# Patient Record
Sex: MALE | Race: WHITE | NOT HISPANIC OR LATINO | Employment: OTHER | URBAN - METROPOLITAN AREA
[De-identification: names, ages, dates, MRNs, and addresses within clinical notes are randomized per-mention and may not be internally consistent; named-entity substitution may affect disease eponyms.]

---

## 2017-03-07 ENCOUNTER — HOSPITAL ENCOUNTER (OUTPATIENT)
Facility: CLINIC | Age: 67
End: 2017-03-07
Attending: INTERNAL MEDICINE | Admitting: INTERNAL MEDICINE
Payer: MEDICARE

## 2017-03-31 ENCOUNTER — ANESTHESIA (OUTPATIENT)
Dept: GASTROENTEROLOGY | Facility: CLINIC | Age: 67
End: 2017-03-31

## 2017-03-31 ENCOUNTER — ANESTHESIA EVENT (OUTPATIENT)
Dept: GASTROENTEROLOGY | Facility: CLINIC | Age: 67
End: 2017-03-31

## 2017-05-23 ENCOUNTER — HOSPITAL ENCOUNTER (OUTPATIENT)
Facility: CLINIC | Age: 67
Discharge: HOME | End: 2017-05-23
Attending: INTERNAL MEDICINE | Admitting: INTERNAL MEDICINE
Payer: MEDICARE

## 2017-05-23 ENCOUNTER — ANESTHESIA (OUTPATIENT)
Dept: GASTROENTEROLOGY | Facility: CLINIC | Age: 67
End: 2017-05-23
Payer: MEDICARE

## 2017-05-23 ENCOUNTER — SURGERY (OUTPATIENT)
Age: 67
End: 2017-05-23

## 2017-05-23 ENCOUNTER — ANESTHESIA EVENT (OUTPATIENT)
Dept: GASTROENTEROLOGY | Facility: CLINIC | Age: 67
End: 2017-05-23
Payer: MEDICARE

## 2017-05-23 VITALS
HEIGHT: 73 IN | DIASTOLIC BLOOD PRESSURE: 83 MMHG | BODY MASS INDEX: 41.75 KG/M2 | RESPIRATION RATE: 19 BRPM | SYSTOLIC BLOOD PRESSURE: 130 MMHG | WEIGHT: 315 LBS | OXYGEN SATURATION: 94 %

## 2017-05-23 LAB — COLONOSCOPY: NORMAL

## 2017-05-23 PROCEDURE — 25000125 ZZHC RX 250: Performed by: NURSE ANESTHETIST, CERTIFIED REGISTERED

## 2017-05-23 PROCEDURE — 45378 DIAGNOSTIC COLONOSCOPY: CPT | Performed by: INTERNAL MEDICINE

## 2017-05-23 PROCEDURE — 25000128 H RX IP 250 OP 636: Performed by: NURSE ANESTHETIST, CERTIFIED REGISTERED

## 2017-05-23 PROCEDURE — G0105 COLORECTAL SCRN; HI RISK IND: HCPCS | Performed by: INTERNAL MEDICINE

## 2017-05-23 PROCEDURE — 37000008 ZZH ANESTHESIA TECHNICAL FEE, 1ST 30 MIN: Performed by: INTERNAL MEDICINE

## 2017-05-23 PROCEDURE — 40000010 ZZH STATISTIC ANES STAT CODE-CRNA PER MINUTE: Performed by: INTERNAL MEDICINE

## 2017-05-23 PROCEDURE — 37000009 ZZH ANESTHESIA TECHNICAL FEE, EACH ADDTL 15 MIN: Performed by: INTERNAL MEDICINE

## 2017-05-23 RX ORDER — LIDOCAINE HYDROCHLORIDE 20 MG/ML
INJECTION, SOLUTION INFILTRATION; PERINEURAL PRN
Status: DISCONTINUED | OUTPATIENT
Start: 2017-05-23 | End: 2017-05-23

## 2017-05-23 RX ORDER — PROPOFOL 10 MG/ML
INJECTION, EMULSION INTRAVENOUS CONTINUOUS PRN
Status: DISCONTINUED | OUTPATIENT
Start: 2017-05-23 | End: 2017-05-23

## 2017-05-23 RX ORDER — LIRAGLUTIDE 6 MG/ML
INJECTION SUBCUTANEOUS DAILY
COMMUNITY

## 2017-05-23 RX ORDER — ONDANSETRON 2 MG/ML
4 INJECTION INTRAMUSCULAR; INTRAVENOUS
Status: DISCONTINUED | OUTPATIENT
Start: 2017-05-23 | End: 2017-05-23 | Stop reason: HOSPADM

## 2017-05-23 RX ORDER — ONDANSETRON 2 MG/ML
INJECTION INTRAMUSCULAR; INTRAVENOUS PRN
Status: DISCONTINUED | OUTPATIENT
Start: 2017-05-23 | End: 2017-05-23

## 2017-05-23 RX ORDER — FENTANYL CITRATE 50 UG/ML
INJECTION, SOLUTION INTRAMUSCULAR; INTRAVENOUS PRN
Status: DISCONTINUED | OUTPATIENT
Start: 2017-05-23 | End: 2017-05-23

## 2017-05-23 RX ORDER — LIDOCAINE 40 MG/G
CREAM TOPICAL
Status: DISCONTINUED | OUTPATIENT
Start: 2017-05-23 | End: 2017-05-23 | Stop reason: HOSPADM

## 2017-05-23 RX ORDER — SODIUM CHLORIDE, SODIUM LACTATE, POTASSIUM CHLORIDE, CALCIUM CHLORIDE 600; 310; 30; 20 MG/100ML; MG/100ML; MG/100ML; MG/100ML
INJECTION, SOLUTION INTRAVENOUS CONTINUOUS PRN
Status: DISCONTINUED | OUTPATIENT
Start: 2017-05-23 | End: 2017-05-23

## 2017-05-23 RX ADMIN — LIDOCAINE HYDROCHLORIDE 60 MG: 20 INJECTION, SOLUTION INFILTRATION; PERINEURAL at 14:16

## 2017-05-23 RX ADMIN — FENTANYL CITRATE 50 MCG: 50 INJECTION, SOLUTION INTRAMUSCULAR; INTRAVENOUS at 14:16

## 2017-05-23 RX ADMIN — DEXMEDETOMIDINE HYDROCHLORIDE 12 MCG: 100 INJECTION, SOLUTION INTRAVENOUS at 14:15

## 2017-05-23 RX ADMIN — ONDANSETRON 4 MG: 2 INJECTION INTRAMUSCULAR; INTRAVENOUS at 14:51

## 2017-05-23 RX ADMIN — MIDAZOLAM HYDROCHLORIDE 2 MG: 1 INJECTION, SOLUTION INTRAMUSCULAR; INTRAVENOUS at 14:16

## 2017-05-23 RX ADMIN — DEXMEDETOMIDINE HYDROCHLORIDE 8 MCG: 100 INJECTION, SOLUTION INTRAVENOUS at 14:27

## 2017-05-23 RX ADMIN — PROPOFOL 100 MCG/KG/MIN: 10 INJECTION, EMULSION INTRAVENOUS at 14:17

## 2017-05-23 RX ADMIN — SODIUM CHLORIDE, POTASSIUM CHLORIDE, SODIUM LACTATE AND CALCIUM CHLORIDE: 600; 310; 30; 20 INJECTION, SOLUTION INTRAVENOUS at 14:16

## 2017-05-23 ASSESSMENT — LIFESTYLE VARIABLES: TOBACCO_USE: 0

## 2017-05-23 ASSESSMENT — ENCOUNTER SYMPTOMS: SEIZURES: 0

## 2017-05-23 NOTE — ANESTHESIA CARE TRANSFER NOTE
Patient: Vikash Darby    Procedure(s):  COLONSCOPY - Wound Class: II-Clean Contaminated    Diagnosis: HISTORY OF COLON POLYPS, FIVE YEAR RECALL  Diagnosis Additional Information: No value filed.    Anesthesia Type:   MAC     Note:  Airway :Oral Airway and Nasal Cannula  Patient transferred to:Phase II  Comments: Transferred to PACU, spontaneous respirations. Oxygen via nasal cannual at 2 LPM to PACU, connected to wall O2 in PACU. All monitors and alarms on and functioning, clinically stable vital signs. Report given to PACU RN and questions answered.       Vitals: (Last set prior to Anesthesia Care Transfer)    CRNA VITALS  5/23/2017 1427 - 5/23/2017 1511      5/23/2017             Resp Rate (set): 10                Electronically Signed By: JADE Beebe CRNA  May 23, 2017  3:11 PM

## 2017-05-23 NOTE — ANESTHESIA PREPROCEDURE EVALUATION
"Procedure: Procedure(s):  COLONOSCOPY  Preop diagnosis: HISTORY OF COLON POLYPS, FIVE YEAR RECALL  Allergies not on file  There is no problem list on file for this patient.    History reviewed. No pertinent past medical history.  History reviewed. No pertinent surgical history.    No current facility-administered medications on file prior to encounter.   No current outpatient prescriptions on file prior to encounter.  BP (!) 141/96  Resp 16  Ht 1.854 m (6' 1\")  Wt (!) 158.8 kg (350 lb)  SpO2 92%  BMI 46.18 kg/m2    EKG- ST, LAD,RBBB with LAFB    Anesthesia Evaluation     . Pt has not had prior anesthetic            ROS/MED HX    ENT/Pulmonary: Comment: No formal sleep study, but probably has it given history    (+)SANDHYA risk factors snores loudly, obese, observed stopped breathing , . .   (-) tobacco use and recent URI   Neurologic:      (-) seizures, CVA and migraines   Cardiovascular: Comment: Poor images with stress echo, scheduled to have nuclear stress 5/25/2017    (+) hypertension----. : . . . :. .       METS/Exercise Tolerance:     Hematologic:  - neg hematologic  ROS       Musculoskeletal:  - neg musculoskeletal ROS       GI/Hepatic:     (+) Other GI/Hepatic hx polyps     (-) GERD   Renal/Genitourinary:     (+) BPH,       Endo:     (+) type II DM Obesity, .   (-) thyroid disease   Psychiatric:         Infectious Disease:         Malignancy:         Other:                     Physical Exam  Normal systems: pulmonary and dental    Airway   Mallampati: III  TM distance: >3 FB  Neck ROM: full    Dental     Cardiovascular   Rhythm and rate: regular and normal      Pulmonary    breath sounds clear to auscultation                    Anesthesia Plan      History & Physical Review  History and physical reviewed and following examination; no interval change.    ASA Status:  3 .    NPO Status:  > 8 hours    Plan for MAC Reason for MAC:  Deep or markedly invasive procedure (G8)         Postoperative " Care  Postoperative pain management:  IV analgesics.      Consents  Anesthetic plan, risks, benefits and alternatives discussed with:  Patient..                          .

## 2017-05-23 NOTE — ANESTHESIA POSTPROCEDURE EVALUATION
Patient: Vikash Darby    Procedure(s):  COLONSCOPY - Wound Class: II-Clean Contaminated    Diagnosis:HISTORY OF COLON POLYPS, FIVE YEAR RECALL  Diagnosis Additional Information: No value filed.    Anesthesia Type:  MAC    Note:  Anesthesia Post Evaluation    Patient location during evaluation: PACU  Patient participation: Able to fully participate in evaluation  Level of consciousness: awake and alert  Pain management: adequate  Airway patency: patent  Cardiovascular status: acceptable  Respiratory status: acceptable  Hydration status: acceptable  PONV: none     Anesthetic complications: None          Last vitals:  Vitals:    05/23/17 1500 05/23/17 1515 05/23/17 1530   BP: 111/63 114/72 130/83   Resp: 16 16 19   SpO2: 95% 95% 94%         Electronically Signed By: Lavell Wood MD  May 23, 2017  6:44 PM

## 2017-05-23 NOTE — H&P
"Pre-Endoscopy History and Physical     Vikash Darby MRN# [id number]   YOB: 1950 Age: 66 year old     Date of Procedure: 5/23/2017  Primary care provider: Marcus Beatty  Type of Endoscopy: colonoscopy  Reason for Procedure: Previous adenomatous polyps  Type of Anesthesia Anticipated: MAC    HPI:    Vikash is a 66 year old male who will be undergoing the above procedure.      A history and physical has been performed. The patient's medications and allergies have been reviewed. The risks and benefits of the procedure and the sedation options and risks were discussed with the patient.  All questions were answered and informed consent was obtained.      Allergies not on file     No current facility-administered medications for this encounter.        There is no problem list on file for this patient.       History reviewed. No pertinent past medical history.     History reviewed. No pertinent surgical history.    Social History   Substance Use Topics     Smoking status: Former Smoker     Smokeless tobacco: Not on file     Alcohol use No       History reviewed. No pertinent family history.      PHYSICAL EXAM:   BP (!) 141/96  Resp 16  Ht 1.854 m (6' 1\")  Wt (!) 158.8 kg (350 lb)  SpO2 92%  BMI 46.18 kg/m2 Estimated body mass index is 46.18 kg/(m^2) as calculated from the following:    Height as of this encounter: 1.854 m (6' 1\").    Weight as of this encounter: 158.8 kg (350 lb).   RESP: lungs clear to auscultation - no rales, rhonchi or wheezes  CV: regular rates and rhythm    IMPRESSION   ASA Class 2 - Mild systemic disease      Signed Electronically by: Danielle Geiger MD  May 23, 2017    .          "

## 2019-08-28 ENCOUNTER — HOSPITAL ENCOUNTER (EMERGENCY)
Facility: CLINIC | Age: 69
Discharge: HOME | End: 2019-08-28
Attending: EMERGENCY MEDICINE | Admitting: EMERGENCY MEDICINE
Payer: COMMERCIAL

## 2019-08-28 VITALS
SYSTOLIC BLOOD PRESSURE: 144 MMHG | DIASTOLIC BLOOD PRESSURE: 93 MMHG | HEART RATE: 112 BPM | TEMPERATURE: 97.2 F | OXYGEN SATURATION: 93 % | RESPIRATION RATE: 18 BRPM

## 2019-08-28 DIAGNOSIS — I10 ESSENTIAL HYPERTENSION: ICD-10-CM

## 2019-08-28 DIAGNOSIS — R33.9 URINARY RETENTION WITH INCOMPLETE BLADDER EMPTYING: ICD-10-CM

## 2019-08-28 DIAGNOSIS — N39.0 URINARY TRACT INFECTION WITHOUT HEMATURIA, SITE UNSPECIFIED: ICD-10-CM

## 2019-08-28 LAB
ALBUMIN UR-MCNC: NEGATIVE MG/DL
APPEARANCE UR: CLEAR
BACTERIA #/AREA URNS HPF: ABNORMAL /HPF
BILIRUB UR QL STRIP: NEGATIVE
COLOR UR AUTO: YELLOW
CREAT BLD-MCNC: 1 MG/DL (ref 0.66–1.25)
GFR SERPL CREATININE-BSD FRML MDRD: 75 ML/MIN/{1.73_M2}
GLUCOSE UR STRIP-MCNC: NEGATIVE MG/DL
HGB UR QL STRIP: ABNORMAL
KETONES UR STRIP-MCNC: NEGATIVE MG/DL
LEUKOCYTE ESTERASE UR QL STRIP: NEGATIVE
MUCOUS THREADS #/AREA URNS LPF: PRESENT /LPF
NITRATE UR QL: NEGATIVE
PH UR STRIP: 5.5 PH (ref 5–7)
RBC #/AREA URNS AUTO: 143 /HPF (ref 0–2)
SOURCE: ABNORMAL
SP GR UR STRIP: 1.01 (ref 1–1.03)
UROBILINOGEN UR STRIP-MCNC: NORMAL MG/DL (ref 0–2)
WBC #/AREA URNS AUTO: 8 /HPF (ref 0–5)

## 2019-08-28 PROCEDURE — 99283 EMERGENCY DEPT VISIT LOW MDM: CPT

## 2019-08-28 PROCEDURE — 25000125 ZZHC RX 250: Performed by: EMERGENCY MEDICINE

## 2019-08-28 PROCEDURE — 81001 URINALYSIS AUTO W/SCOPE: CPT | Performed by: EMERGENCY MEDICINE

## 2019-08-28 PROCEDURE — 82565 ASSAY OF CREATININE: CPT

## 2019-08-28 RX ORDER — LIDOCAINE HYDROCHLORIDE 20 MG/ML
1 JELLY TOPICAL ONCE
Status: COMPLETED | OUTPATIENT
Start: 2019-08-28 | End: 2019-08-28

## 2019-08-28 RX ORDER — TERAZOSIN 2 MG/1
4 CAPSULE ORAL AT BEDTIME
Qty: 30 CAPSULE | Refills: 0 | Status: SHIPPED | OUTPATIENT
Start: 2019-08-28

## 2019-08-28 RX ORDER — CEPHALEXIN 500 MG/1
500 CAPSULE ORAL 2 TIMES DAILY
Qty: 14 CAPSULE | Refills: 0 | Status: SHIPPED | OUTPATIENT
Start: 2019-08-28 | End: 2019-09-04

## 2019-08-28 RX ADMIN — LIDOCAINE HYDROCHLORIDE 1 TUBE: 20 JELLY TOPICAL at 16:30

## 2019-08-28 ASSESSMENT — ENCOUNTER SYMPTOMS
FEVER: 0
DIFFICULTY URINATING: 1
BACK PAIN: 0
FLANK PAIN: 0

## 2019-08-28 NOTE — ED TRIAGE NOTES
Woke up early morning with pain and burning with urination since this morning and having difficulty voiding since 0700.

## 2019-08-28 NOTE — ED PROVIDER NOTES
History     Chief Complaint:    Urinary Retention      HPI   Vikash Darby is a 68 year old male on Terazosin for hypertension who presents to the emergency department today with urinary retention. The patient states that last night he started getting up several times with the feeling of having to go to the bathroom, and the feeling of a full bladder is still present here in the ED. He states that his last urination was at [id number] this morning. He states that he had an occurrence about a month ago where he was waking up at night to go to the bathroom a lot, but never got to the point where he could not urinate. He denies an recent surgery, fever, back/flank pain or medicine changes.     Allergies:  No Known Allergies     Medications:    Victoza  Losartan  Metformin  Terazosin  Glucophage  Zocor    Past Medical History:    Diabetes  Hypertension    Past Surgical History:    History reviewed. No pertinent surgical history    Family History:    Father - macular     Social History:  The patient was accompanied to the ED alone.  Smoking Status: Former  Smokeless Tobacco: Never  Alcohol Use: No    Marital Status:       Review of Systems   Constitutional: Negative for fever.   Genitourinary: Positive for difficulty urinating. Negative for flank pain.   Musculoskeletal: Negative for back pain.   All other systems reviewed and are negative.        Physical Exam   First Vitals:  BP: (!) 178/112  Pulse: 133  Temp: 97.2  F (36.2  C)  Resp: 18  SpO2: 97 %      Physical Exam   General: Alert, interactive in mild distress  Head:  Scalp is atraumatic  Eyes:  The pupils are equal, round, and reactive to light    EOM's intact    No scleral icterus  ENT:      Nose:  The external nose is normal  Ears:  External ears are normal  Mouth/Throat: The oropharynx is normal    Mucus membranes are moist       Neck:  Normal range of motion.      There is no rigidity.    Trachea is in the midline         CV:  Tachycardia    No murmur    Resp:  Breath sounds are clear bilaterally    Non-labored, no retractions or accessory muscle use      GI:  Abdomen is soft, no distension, suprapubic tenderness. Bladder distention. No CVA tenderness.      MS:  Normal strength in all 4 extremities   Skin:  Warm and dry, No rash or lesions noted.  Neuro: Strength 5/5 x4.    Psych:  Awake. Alert.  Normal affect.      Appropriate interactions.        Emergency Department Course     Laboratory:  Creatinine POCT: 1.0  UA: Blood: moderate, RBC/HPF: 143 (H), WBC/HPF: 8 (H), Bacteria: few, Mucous: present, o/w Negative      Interventions:  1630 Xylocaine 1 Tube urethral    Emergency Department Course:  Nursing notes and vitals reviewed. (1615) I performed an exam of the patient as documented above.     IV inserted. Medicine administered as documented above. Blood drawn. This was sent to the lab for further testing, results above.      1648 I rechecked the patient and discussed the results of his workup thus far.     Findings and plan explained to the Patient. Patient discharged home with instructions regarding supportive care, medications, and reasons to return. The importance of close follow-up was reviewed. The patient was prescribed Keflex.    I personally reviewed the laboratory results with the Patient and answered all related questions prior to discharge.      Impression & Plan      Medical Decision Making:  Vikash Darby is a 68 year old male was seen and evaluated and the above workup was undertaken. Findings are consistent with urinary retention likely secondary to BPH. He does have pyuria and bacteruria and I will empirically treat with cephalexin. His kidney function is normal, there are no signs of sepsis syndrome or more concerning illness. I doubt prostatitis given no risk factors. I have increased the dose of his Terazosin for management of the BPH. He will follow up with his primary care provider as well as urologic associates and will return if any new  symptoms develop. Patient was feeling much improved after catheter was placed. His blood pressure trended down as did his heart rate.     Diagnosis:    ICD-10-CM    1. Urinary retention with incomplete bladder emptying R33.9 UA reflex to Microscopic and Culture   2. Essential hypertension I10    3. Urinary tract infection without hematuria, site unspecified N39.0        Disposition:  discharged to home    Discharge Medications:  Discharge Medication List as of 8/28/2019  5:26 PM      START taking these medications    Details   cephALEXin (KEFLEX) 500 MG capsule Take 1 capsule (500 mg) by mouth 2 times daily for 7 days, Disp-14 capsule, R-0, Local Print             Scribe Disclosure:  I, Mo Loyd, am serving as a scribe at 4:31 PM on 8/28/2019 to document services personally performed by Román Norwood, based on my observations and the provider's statements to me.     8/28/2019    EMERGENCY DEPARTMENT       Román Norwood MD  08/28/19 [id number]

## 2019-08-28 NOTE — ED AVS SNAPSHOT
Emergency Department  [address] [place] [address]  Phone:  [phone]  Fax:  [phone]                                    Vikash Darby   MRN: [id number]    Department:   Emergency Department   Date of Visit:  8/28/2019           After Visit Summary Signature Page    I have received my discharge instructions, and my questions have been answered. I have discussed any challenges I see with this plan with the nurse or doctor.    ..........................................................................................................................................  Patient/Patient Representative Signature      ..........................................................................................................................................  Patient Representative Print Name and Relationship to Patient    ..................................................               ................................................  Date                                   Time    ..........................................................................................................................................  Reviewed by Signature/Title    ...................................................              ..............................................  Date                                               Time          22EPIC Rev 08/18

## 2019-10-03 ENCOUNTER — HOSPITAL ENCOUNTER (EMERGENCY)
Facility: CLINIC | Age: 69
Discharge: HOME | End: 2019-10-04
Attending: EMERGENCY MEDICINE | Admitting: EMERGENCY MEDICINE
Payer: COMMERCIAL

## 2019-10-03 DIAGNOSIS — R00.0 TACHYCARDIA: ICD-10-CM

## 2019-10-03 DIAGNOSIS — T83.9XXA PROBLEM WITH FOLEY CATHETER, INITIAL ENCOUNTER (H): ICD-10-CM

## 2019-10-03 DIAGNOSIS — R10.84 ABDOMINAL PAIN, GENERALIZED: ICD-10-CM

## 2019-10-03 DIAGNOSIS — R33.9 URINARY RETENTION: ICD-10-CM

## 2019-10-03 LAB — INTERPRETATION ECG - MUSE: NORMAL

## 2019-10-03 PROCEDURE — 99284 EMERGENCY DEPT VISIT MOD MDM: CPT | Mod: 25

## 2019-10-03 PROCEDURE — 51798 US URINE CAPACITY MEASURE: CPT

## 2019-10-03 PROCEDURE — 93005 ELECTROCARDIOGRAM TRACING: CPT

## 2019-10-03 ASSESSMENT — ENCOUNTER SYMPTOMS
ABDOMINAL PAIN: 1
SHORTNESS OF BREATH: 0

## 2019-10-03 NOTE — ED AVS SNAPSHOT
Emergency Department  [address] [place] [address]  Phone:  [phone]  Fax:  [phone]                                    Vikash Darby   MRN: [id number]    Department:   Emergency Department   Date of Visit:  10/3/2019           After Visit Summary Signature Page    I have received my discharge instructions, and my questions have been answered. I have discussed any challenges I see with this plan with the nurse or doctor.    ..........................................................................................................................................  Patient/Patient Representative Signature      ..........................................................................................................................................  Patient Representative Print Name and Relationship to Patient    ..................................................               ................................................  Date                                   Time    ..........................................................................................................................................  Reviewed by Signature/Title    ...................................................              ..............................................  Date                                               Time          22EPIC Rev 08/18

## 2019-10-04 VITALS
TEMPERATURE: 95 F | OXYGEN SATURATION: 97 % | HEART RATE: 124 BPM | SYSTOLIC BLOOD PRESSURE: 131 MMHG | DIASTOLIC BLOOD PRESSURE: 78 MMHG | RESPIRATION RATE: 16 BRPM

## 2019-10-04 NOTE — ED PROVIDER NOTES
History     Chief Complaint:  Abdominal pain    HPI   Vikash Darby is a 69 year old male who presents with abdominal pain. The patient says that this morning he had a catheter placed this morning due to his enlarge prostate. He says that since then he has been having waves of intense abdominal pain every 15 minutes. He denies any chest pain or shortness of breath. He notes that his increased heart rate is caused by the stress from the pain.     Allergies:  No Known Drug Allergies     Medications:    Victoza  Losartan  Metformin  Hytrin   Zocor     Past Medical History:    Diabetes  Hypertension  Enlarged prostate    Past Surgical History:    Colonoscopy   Septoplasty     Family History:    hypertension  Macular degeneration    Social History:  Smoking Status: Former Smoker  Alcohol Use: Negative  Drug Use: Negative  PCP: Manoj Wood   Marital Status:  Single      Review of Systems   Respiratory: Negative for shortness of breath.    Cardiovascular: Negative for chest pain.   Gastrointestinal: Positive for abdominal pain.   All other systems reviewed and are negative.      Physical Exam     Patient Vitals for the past 24 hrs:   BP Temp Temp src Pulse Resp SpO2   10/04/19 0029 -- -- -- 124 -- --   10/04/19 0022 131/78 -- -- 128 16 97 %   10/03/19 2308 (!) 160/96 95  F (35  C) Oral 148 20 95 %        Physical Exam  General: No distress.   Head: No signs of trauma.   Mouth/Throat: Oropharynx moist.   Eyes: Conjunctivae are normal. Pupils are equal..   Neck: Normal range of motion.    Resp:No respiratory distress.   CV:  tachycardic regular.  MSK: Normal range of motion. No obvious deformity.  : [place] catheter in place. Bright red blood from the urethra  Neuro: The patient is alert and interactive. SLOAN. Speech normal. GCS 15  Skin: No lesion or sign of trauma noted.   Psych: normal mood and affect. behavior is normal.     Emergency Department Course     ECG:  ECG taken at 2313  Wide QRS tachycardia  right  bundle  branch block  Left anterior fascicular block  Bifascicular block  Abnormal ECG  Rate 139 bpm. RI interval * ms. QRS duration 124 ms. QT/QTc 336/511 ms. P-R-T axes * -79 35.    Emergency Department Course:    2311 Nursing notes and vitals reviewed.    2324 I performed an exam of the patient as documented above.     0045 The patient is discharged to home.       Impression & Plan      Medical Decision Making:  Vikash Darby is a 69 year old male who presents for evaluation of abdominal pain and decreased urinary output. They have a george catheter in place because of an enlarged prostate and it appears it is placed incorrectly.  The history and exam are consistent with acute urinary retention from misplaced george catheter.  Will send home with adjusted catheter and have patient followup with urology as needed.  Patient is stable for discharge home.      The patient was tachycardic during his visit, tachycardia was also present in his last visit here in August. The patient attributed it to his stress. It did trend down over his visit, but it remained present. He did not want any further workup concerning this.    Diagnosis:    ICD-10-CM    1. Abdominal pain, generalized R10.84    2. Urinary retention R33.9    3. Problem with George catheter, initial encounter (H) T83.9XXA    4. Tachycardia R00.0      Disposition:   Findings and plan explained to the Patient. Patient discharged home with instructions regarding supportive care, medications, and reasons to return. The importance of close follow-up was reviewed.     Discharge Medications:  No discharge medications.      Scribe Disclosure:  I, Ruben Mcmanus, am serving as a scribe at 11:14 PM on 10/3/2019 to document services personally performed by Dean Lara MD based on my observations and the provider's statements to me.       EMERGENCY DEPARTMENT       Dean Lara MD  10/28/19 [id number]

## 2019-10-04 NOTE — ED NOTES
Pt has catheter attached to leg bag which he states was placed this morning due to enlarged prostate and urinary retention.  Pt states that when clinic staff inserted his catheter that he had pain when the balloon was inflated.  Catheter does not move in or out of the bladder when attempted to check for patency.  Bladder scan shows that patient has 452ml of retained urine in his bladder.  Catheter balloon deflated, catheter advanced 1-2cm and balloon reinflated with ease.  Urine return noted immediately.  500cc of dark godwin urine drained from bladder.  Blood noted around cathteter after balloon was reinflated.  MD notified of results.  Pt reports relief.  Will continue to monitor.

## 2024-07-01 ENCOUNTER — HOSPITAL ENCOUNTER (OUTPATIENT)
Dept: CT IMAGING | Facility: CLINIC | Age: 74
Discharge: HOME | End: 2024-07-01
Attending: INTERNAL MEDICINE | Admitting: INTERNAL MEDICINE
Payer: COMMERCIAL

## 2024-07-01 DIAGNOSIS — E66.9 OBESITY: ICD-10-CM

## 2024-07-01 DIAGNOSIS — E24.0 CUSHING'S DISEASE (H): ICD-10-CM

## 2024-07-01 PROCEDURE — 74150 CT ABDOMEN W/O CONTRAST: CPT

## 2024-10-03 DIAGNOSIS — I50.9 HEART FAILURE, UNSPECIFIED (H): Primary | ICD-10-CM

## 2024-11-15 ENCOUNTER — HOSPITAL ENCOUNTER (OUTPATIENT)
Dept: CARDIOLOGY | Facility: CLINIC | Age: 74
Discharge: HOME | End: 2024-11-15
Attending: INTERNAL MEDICINE | Admitting: INTERNAL MEDICINE
Payer: COMMERCIAL

## 2024-11-15 DIAGNOSIS — I50.9 HEART FAILURE, UNSPECIFIED (H): ICD-10-CM

## 2024-11-15 LAB — LVEF ECHO: NORMAL

## 2024-11-15 PROCEDURE — 999N000208 ECHOCARDIOGRAM COMPLETE

## 2024-11-15 PROCEDURE — 93306 TTE W/DOPPLER COMPLETE: CPT | Mod: 26 | Performed by: INTERNAL MEDICINE

## 2024-11-15 PROCEDURE — 255N000002 HC RX 255 OP 636: Performed by: INTERNAL MEDICINE

## 2024-11-15 PROCEDURE — 93306 TTE W/DOPPLER COMPLETE: CPT

## 2024-11-15 RX ADMIN — HUMAN ALBUMIN MICROSPHERES AND PERFLUTREN 9 ML: 10; .22 INJECTION, SOLUTION INTRAVENOUS at 13:30

## (undated) RX ORDER — FENTANYL CITRATE 50 UG/ML
INJECTION, SOLUTION INTRAMUSCULAR; INTRAVENOUS
Status: DISPENSED
Start: 2017-05-23